# Patient Record
Sex: MALE | Race: WHITE | URBAN - METROPOLITAN AREA
[De-identification: names, ages, dates, MRNs, and addresses within clinical notes are randomized per-mention and may not be internally consistent; named-entity substitution may affect disease eponyms.]

---

## 2024-11-08 ENCOUNTER — OFFICE VISIT (OUTPATIENT)
Dept: FAMILY MEDICINE CLINIC | Facility: CLINIC | Age: 54
End: 2024-11-08
Payer: COMMERCIAL

## 2024-11-08 VITALS
TEMPERATURE: 98 F | OXYGEN SATURATION: 98 % | HEART RATE: 64 BPM | BODY MASS INDEX: 28.63 KG/M2 | RESPIRATION RATE: 18 BRPM | HEIGHT: 70 IN | SYSTOLIC BLOOD PRESSURE: 130 MMHG | DIASTOLIC BLOOD PRESSURE: 72 MMHG | WEIGHT: 200 LBS

## 2024-11-08 DIAGNOSIS — M79.672 FOOT PAIN, LEFT: Primary | ICD-10-CM

## 2024-11-08 PROCEDURE — 3078F DIAST BP <80 MM HG: CPT | Performed by: PHYSICIAN ASSISTANT

## 2024-11-08 PROCEDURE — 3008F BODY MASS INDEX DOCD: CPT | Performed by: PHYSICIAN ASSISTANT

## 2024-11-08 PROCEDURE — 99202 OFFICE O/P NEW SF 15 MIN: CPT | Performed by: PHYSICIAN ASSISTANT

## 2024-11-08 PROCEDURE — 3075F SYST BP GE 130 - 139MM HG: CPT | Performed by: PHYSICIAN ASSISTANT

## 2024-11-08 RX ORDER — PREDNISONE 20 MG/1
TABLET ORAL
Qty: 10 TABLET | Refills: 0 | Status: SHIPPED | OUTPATIENT
Start: 2024-11-08

## 2024-11-08 NOTE — PROGRESS NOTES
CHIEF COMPLAINT:     Chief Complaint   Patient presents with    Gout     Left food, started yesterday        HPI:   Jorge A Concepcion is a 53 year old male who presents with painful swollen left foot.  Symptoms since yesterday.  Throbbing pain  last night and better today with motrin.  No new activities.  No trauma, no history of gout or other arthropathies.  No other current joint complaints.  No fevers or chills.  No open sore or skin on the foot.  No diet changes or high protein meal.      Current Outpatient Medications   Medication Sig Dispense Refill    predniSONE 20 MG Oral Tab Take 2 PO once daily for 5 days.  Take with food. 10 tablet 0      No past medical history on file.   Social History:  Social History     Socioeconomic History    Marital status:    Tobacco Use    Smoking status: Never    Smokeless tobacco: Never        REVIEW OF SYSTEMS:   GENERAL: Denies fever, chills,weight change, decreased appetite  SKIN: Denies rashes, skin wounds or ulcers.  EYES: Denies blurred vision or double vision  HENT: Denies congestion, rhinorrhea, sore throat or ear pain  CHEST: Denies chest pain, or palpitations  LUNGS: Denies shortness of breath, cough, or wheezing  GI: Denies abdominal pain, N/V/C/D.   MUSCULOSKELETAL: Per HPI  LYMPH:  Denies lymphadenopathy  NEURO: Denies headaches or lightheadedness    Positive for stated complaint: left foot pain.   Pertinent positives and negatives noted in the the HPI.    /72   Pulse 64   Temp 97.5 °F (36.4 °C)   Resp 18   Ht 5' 10\" (1.778 m)   Wt 200 lb (90.7 kg)   SpO2 98%   BMI 28.70 kg/m²   GENERAL: well developed, well nourished,in no apparent distress  SKIN: no rashes,no suspicious lesions  HEAD: atraumatic, normocephalic  EYES: conjunctiva clear, sclera white,  PERRLA  LUNGS: clear to auscultation bilaterally without rale, ronchi, wheeze.  CARDIO: S1/S2 without murmur  EXTREMITIES: no cyanosis, clubbing or edema  LYMPH:  no lymphadenopathy.   Left foot:   some mild swelling over dorsal lateral left foot.  No open skin. No ecchymosis. Tender to palpation and slightly warm.  Tender with metatarsal sheer.  Full ankle ROM, no MTP joint tenderness.     No results found for this or any previous visit (from the past 24 hours).      ASSESSMENT AND PLAN:   Jorge A Concepcion is a 53 year old male who presents with Gout (Left food, started yesterday ). Symptoms are consistent with:      ASSESSMENT:  Encounter Diagnosis   Name Primary?    Foot pain, left Yes     Possible gout?  Advise ibuprofen 200 mg 1 po tid with food, rest, push fluids.  Given written script for prednisone if not providing adequate relief.  Minimize protein and etoh in diet.  Follow up PCP.  Seek higher level of care for re evaluation/imaging if not steadily improving.       PLAN: Meds as below.  Comfort care instructions as listed in Patient Instructions    Meds & Refills for this Visit:  Requested Prescriptions     Signed Prescriptions Disp Refills    predniSONE 20 MG Oral Tab 10 tablet 0     Sig: Take 2 PO once daily for 5 days.  Take with food.       Risks, benefits, side effects of medication addressed and explained.    There are no Patient Instructions on file for this visit.    The patient indicates understanding of these issues and agrees to the plan.  The patient is asked to follow up PCP.